# Patient Record
Sex: MALE | Race: WHITE
[De-identification: names, ages, dates, MRNs, and addresses within clinical notes are randomized per-mention and may not be internally consistent; named-entity substitution may affect disease eponyms.]

---

## 2019-01-31 ENCOUNTER — HOSPITAL ENCOUNTER (EMERGENCY)
Dept: HOSPITAL 95 - ER | Age: 54
Discharge: ANOTHER HOSPITAL | End: 2019-01-31
Payer: COMMERCIAL

## 2019-01-31 VITALS — HEIGHT: 71 IN | BODY MASS INDEX: 30.8 KG/M2 | WEIGHT: 220 LBS

## 2019-01-31 DIAGNOSIS — S06.6X9A: Primary | ICD-10-CM
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
## 2019-01-31 DIAGNOSIS — Z87.891: ICD-10-CM

## 2019-01-31 DIAGNOSIS — S02.19XA: ICD-10-CM
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
## 2019-01-31 LAB
ALBUMIN SERPL BCP-MCNC: 4 G/DL (ref 3.4–5)
ALBUMIN/GLOB SERPL: 1.1 {RATIO} (ref 0.8–1.8)
ALT SERPL W P-5'-P-CCNC: 41 U/L (ref 12–78)
ANION GAP SERPL CALCULATED.4IONS-SCNC: 11 MMOL/L (ref 6–16)
AST SERPL W P-5'-P-CCNC: 48 U/L (ref 12–37)
BASOPHILS # BLD AUTO: 0.07 K/MM3 (ref 0–0.23)
BASOPHILS NFR BLD AUTO: 1 % (ref 0–2)
BILIRUB SERPL-MCNC: 0.6 MG/DL (ref 0.1–1)
BUN SERPL-MCNC: 20 MG/DL (ref 8–24)
CALCIUM SERPL-MCNC: 8.9 MG/DL (ref 8.5–10.1)
CHLORIDE SERPL-SCNC: 108 MMOL/L (ref 98–108)
CO2 SERPL-SCNC: 21 MMOL/L (ref 21–32)
CREAT SERPL-MCNC: 0.98 MG/DL (ref 0.6–1.2)
DEPRECATED RDW RBC AUTO: 43 FL (ref 35.1–46.3)
EOSINOPHIL # BLD AUTO: 0.26 K/MM3 (ref 0–0.68)
EOSINOPHIL NFR BLD AUTO: 4 % (ref 0–6)
ERYTHROCYTE [DISTWIDTH] IN BLOOD BY AUTOMATED COUNT: 12.4 % (ref 11.7–14.2)
GLOBULIN SER CALC-MCNC: 3.5 G/DL (ref 2.2–4)
GLUCOSE SERPL-MCNC: 140 MG/DL (ref 70–99)
HCT VFR BLD AUTO: 44.9 % (ref 37–53)
HGB BLD-MCNC: 15.3 G/DL (ref 13.5–17.5)
IMM GRANULOCYTES # BLD AUTO: 0.07 K/MM3 (ref 0–0.1)
IMM GRANULOCYTES NFR BLD AUTO: 1 % (ref 0–1)
LYMPHOCYTES # BLD AUTO: 2.53 K/MM3 (ref 0.84–5.2)
LYMPHOCYTES NFR BLD AUTO: 35 % (ref 21–46)
MCHC RBC AUTO-ENTMCNC: 34.1 G/DL (ref 31.5–36.5)
MCV RBC AUTO: 94 FL (ref 80–100)
MONOCYTES # BLD AUTO: 0.73 K/MM3 (ref 0.16–1.47)
MONOCYTES NFR BLD AUTO: 10 % (ref 4–13)
NEUTROPHILS # BLD AUTO: 3.5 K/MM3 (ref 1.96–9.15)
NEUTROPHILS NFR BLD AUTO: 49 % (ref 41–73)
NRBC # BLD AUTO: 0 K/MM3 (ref 0–0.02)
NRBC BLD AUTO-RTO: 0 /100 WBC (ref 0–0.2)
PLATELET # BLD AUTO: 206 K/MM3 (ref 150–400)
POTASSIUM SERPL-SCNC: 4.6 MMOL/L (ref 3.5–5.5)
PROT SERPL-MCNC: 7.5 G/DL (ref 6.4–8.2)
SODIUM SERPL-SCNC: 140 MMOL/L (ref 136–145)
TROPONIN I SERPL-MCNC: <0.015 NG/ML (ref 0–0.04)

## 2021-06-14 NOTE — NUR
PATIENT ARRIVED TO [facility]ART CENTER RECOVERY ROOM A&O. LEFT CHEST PACER IMPLANT
SITE SOFT AND NONTENDER. DRESSING DRY AND INTACT. DENIES DISCOMFORT. TOLERATED
PROCEDURE WELL.

## 2021-06-14 NOTE — NUR
PT RETURNED FROM XRAY AFTER 2-V XRAY. LACW PACEMAKER SITE SOFT WITH NO
HEMATOMA, NO BLEEDING AND INTACT DRESSING. MID-CHEST LINQ EXPLANT SITE SOFT
WITH NO HEMATOMA, NO BLEEDING AND INTACT DRESSING. LINQ SITE IS SORE. CALL
LIGHT IN REACH. PT DENIES CHEST PAIN.

## 2021-06-14 NOTE — NUR
PT SECOND ROUND OF ANTIBIOTICS COMPLETED. IV DC'D. CATH INTACT. PRESSURE
DSG IN PLACE. NADN. L SIDED CHEST WALL REMAINS CLEAR. NO BLEEDING NOTED.
VSS. PT DRESSES SELF WITHOUT DIFF. VERBALIZES UNDERSTANDING WRITTEN AND
VERBAL ORDERS. PT DC TO HOME VIA WC BY S/O

## 2021-07-29 NOTE — NUR
SHIFT SUMMARY
 
NO ACUTE EVENTS THIS SHIFT, VSS AND TOLERATING ROOM AIR WELL. PT IS ALERT AND
ORIENTED, COOPERATIVE WITH CARE. ABLE TO AMBULATE IDEPENDENTLY IN ROOM. PT.
COMPLAINS OF SOME PAIN AT PACEMAKER INSERTION SITE, REDNESS NOTED AT SITE,
DOES NOT WANT ANY MEDICATION FOR PAIN AT THIS TIME. CHEST ULTRASOUND DONE IN
ROOM THIS AFTERNOON. PT TAKEN TO HEART CENTER TO CHANGE PACER SETTINGS, PACER
SETTINGS NOW AT LOWER THRESHOLD OF 50 BPM PER HEART CENTER NURSE. TELEMETRY IN
PLACE.

## 2021-07-30 NOTE — NUR
SUMMARY
PT HAD ONE EPISODE OF LEFT HAND DISCOMFORT AND SUDDEN ONSET OF CHILLS. EPISODE
LAST SHORTLY. PT DENIED CX PAIN, N/V, OR SOB. PT HAS SLEPT WELL T/O SHIFT. PT
CURRENTLY SLEEPING IN NO DISTRESS. CALL LIGHT IN REACH.

## 2021-07-30 NOTE — NUR
SHIFT SUMMARY
NO ACUTE CHANGES NOTED TO PT THIS SHIFT. PT AAOX4, ABLE TO MAKE NEEDS KNOWN,
PLEASANT AND COOPERATIVE TO CARE. MEDICATED FOR MILD HEADACHE THIS SHIFT, SEE
EMAR. NO C/O CP, SOB, OR N&V. PT INDEPENDENT IN THE ROOM. BED AT LOWEST
POSITION. CALL LIGHT WITHIN REACH.

## 2021-07-31 NOTE — NUR
SHIFT SUMMARY
PT IS A 57 Y/O MALE, ADMITTED FOR A PACEMAKER INFECTION. HE IS A&O X 4,
INDEPENDENT IN THE ROOM. NO C/O ACUTE PAIN, NAUSEA OR SOB. TELE SHOWED SB IN
THE 50S. VITAL SIGNS STABLE. NO ACUTE CHANGES IN PT CONDITION NOTED DURING THE
NIGHT. WILL CONTINUE TO MONITOR AND TREAT PER EMAR UNTIL HAND OFF TO DAY SHIFT
RN.

## 2021-07-31 NOTE — NUR
PT TRANSFER TO PCU POST PACER POCKET WASH OUT. PT ALERT AND ORIENTED X4. ON
ROOM AIR SATING ABOVE 95%. TELE SHOWING PACED AT 53. DENIES CHEST
PAIN/PRESSURE. VITAL SIGNS STABLE. POST OP VITALS IN PLACE. DRESSING TO LEFT
CHEST WALL CLEAN/DRY/INTACT. DENIES PAIN AT SIGHT AND OVERALL. BOWEL TONES
PRESENT. PATIENT ABLE TO EAT LUNCH. ORIENTED TO UNIT AND CALL LIGHT. WILL
CONTINUE TO MONITOR.

## 2021-07-31 NOTE — NUR
PT WAS HAVING CP THIS AM 2/10; HE DESCRIBED IT AS "UNCOMFORTABLE"
CALLED HOSPITALIST WHICH THEN ORDERED TO CALL THE CARDIOLOGIST, CARDIOLOGIST
CAME IN THE ROOM AND  AT BEDSIDE TO ASSESS THE PT. PT RECEIVED TYLENOLX1 AND
STATED FELT BETTER. PT WILL HAVE A SURGERY IN COUPLE HOURS OF REMOVAL OF THE
PACEMAKER.

## 2021-07-31 NOTE — NUR
DISCHARGE:
 
NO ACUTE CHANGES. VITAL SIGNS STABLE. POST PACER PRECAUTIONS. DISCHARGE
INSTRUCTIONS REVIEWED AND QUESTIONS ANSWERED. MEDS CALLED INTO PHARMACY. IV
REMOVAL WNL. PT LEFT UNIT WITH WIFE WITH ALL PERSONAL BELONGINGS.